# Patient Record
Sex: FEMALE | Race: OTHER | NOT HISPANIC OR LATINO
[De-identification: names, ages, dates, MRNs, and addresses within clinical notes are randomized per-mention and may not be internally consistent; named-entity substitution may affect disease eponyms.]

---

## 2017-08-27 ENCOUNTER — RX RENEWAL (OUTPATIENT)
Age: 62
End: 2017-08-27

## 2018-01-24 ENCOUNTER — NON-APPOINTMENT (OUTPATIENT)
Age: 63
End: 2018-01-24

## 2018-01-24 ENCOUNTER — APPOINTMENT (OUTPATIENT)
Dept: CARDIOLOGY | Facility: CLINIC | Age: 63
End: 2018-01-24
Payer: COMMERCIAL

## 2018-01-24 VITALS
OXYGEN SATURATION: 99 % | HEART RATE: 76 BPM | HEIGHT: 64 IN | SYSTOLIC BLOOD PRESSURE: 134 MMHG | DIASTOLIC BLOOD PRESSURE: 87 MMHG | BODY MASS INDEX: 29.37 KG/M2 | WEIGHT: 172 LBS

## 2018-01-24 PROCEDURE — 99214 OFFICE O/P EST MOD 30 MIN: CPT

## 2018-01-24 PROCEDURE — 93000 ELECTROCARDIOGRAM COMPLETE: CPT

## 2018-08-09 ENCOUNTER — MEDICATION RENEWAL (OUTPATIENT)
Age: 63
End: 2018-08-09

## 2019-01-30 ENCOUNTER — RX RENEWAL (OUTPATIENT)
Age: 64
End: 2019-01-30

## 2019-02-11 ENCOUNTER — RX RENEWAL (OUTPATIENT)
Age: 64
End: 2019-02-11

## 2019-08-08 ENCOUNTER — RX RENEWAL (OUTPATIENT)
Age: 64
End: 2019-08-08

## 2019-09-04 ENCOUNTER — NON-APPOINTMENT (OUTPATIENT)
Age: 64
End: 2019-09-04

## 2019-09-04 ENCOUNTER — APPOINTMENT (OUTPATIENT)
Dept: CARDIOLOGY | Facility: CLINIC | Age: 64
End: 2019-09-04
Payer: COMMERCIAL

## 2019-09-04 VITALS
OXYGEN SATURATION: 100 % | HEIGHT: 64 IN | SYSTOLIC BLOOD PRESSURE: 122 MMHG | BODY MASS INDEX: 29.37 KG/M2 | DIASTOLIC BLOOD PRESSURE: 81 MMHG | WEIGHT: 172 LBS | HEART RATE: 76 BPM

## 2019-09-04 PROCEDURE — 99213 OFFICE O/P EST LOW 20 MIN: CPT

## 2019-09-04 PROCEDURE — 93000 ELECTROCARDIOGRAM COMPLETE: CPT

## 2019-09-04 NOTE — PHYSICAL EXAM
[General Appearance - Well Developed] : well developed [Normal Appearance] : normal appearance [Well Groomed] : well groomed [No Deformities] : no deformities [General Appearance - Well Nourished] : well nourished [General Appearance - In No Acute Distress] : no acute distress [Normal Conjunctiva] : the conjunctiva exhibited no abnormalities [Eyelids - No Xanthelasma] : the eyelids demonstrated no xanthelasmas [Normal Oral Mucosa] : normal oral mucosa [No Oral Pallor] : no oral pallor [No Oral Cyanosis] : no oral cyanosis [Respiration, Rhythm And Depth] : normal respiratory rhythm and effort [Exaggerated Use Of Accessory Muscles For Inspiration] : no accessory muscle use [Auscultation Breath Sounds / Voice Sounds] : lungs were clear to auscultation bilaterally [Heart Rate And Rhythm] : heart rate and rhythm were normal [Heart Sounds] : normal S1 and S2 [Murmurs] : no murmurs present [Edema] : no peripheral edema present [Abdomen Soft] : soft [Abdomen Tenderness] : non-tender [Abnormal Walk] : normal gait [Gait - Sufficient For Exercise Testing] : the gait was sufficient for exercise testing [Cyanosis, Localized] : no localized cyanosis [Skin Color & Pigmentation] : normal skin color and pigmentation [] : no rash [No Venous Stasis] : no venous stasis [Oriented To Time, Place, And Person] : oriented to person, place, and time [Affect] : the affect was normal [Mood] : the mood was normal [No Anxiety] : not feeling anxious [FreeTextEntry1] : no carotid bruits or JVD

## 2019-09-04 NOTE — DISCUSSION/SUMMARY
[Cardiomyopathy] : cardiomyopathy [Hypertension] : hypertension [Stable] : stable [FreeTextEntry1] : \par Currently stable from a cardiovascular standpoint. Normotensive. History of cardiomyopathy (chemo-induced). Currently euvolemic. Atypical chest pains likely musculoskeletal in origin. Continue current medications. ECG completed today and reviewed (findings as noted above). Will schedule an echocardiogram to reassess her cardiac structures and function. Follow up in 6 months.

## 2019-09-04 NOTE — HISTORY OF PRESENT ILLNESS
[FreeTextEntry1] : Doing okay. Denies shortness of breath or palpitations. Occasional left-sided chest pain unrelated to exertion.

## 2020-02-10 ENCOUNTER — OUTPATIENT (OUTPATIENT)
Dept: OUTPATIENT SERVICES | Facility: HOSPITAL | Age: 65
LOS: 1 days | Discharge: HOME | End: 2020-02-10

## 2020-02-10 ENCOUNTER — OUTPATIENT (OUTPATIENT)
Dept: OUTPATIENT SERVICES | Facility: HOSPITAL | Age: 65
LOS: 1 days | End: 2020-02-10

## 2020-02-11 ENCOUNTER — APPOINTMENT (OUTPATIENT)
Dept: HEMATOLOGY ONCOLOGY | Facility: CLINIC | Age: 65
End: 2020-02-11
Payer: MEDICARE

## 2020-02-11 ENCOUNTER — RESULT REVIEW (OUTPATIENT)
Age: 65
End: 2020-02-11

## 2020-02-11 VITALS
SYSTOLIC BLOOD PRESSURE: 111 MMHG | TEMPERATURE: 98.9 F | OXYGEN SATURATION: 100 % | BODY MASS INDEX: 28.31 KG/M2 | WEIGHT: 164.88 LBS | HEART RATE: 72 BPM | RESPIRATION RATE: 15 BRPM | DIASTOLIC BLOOD PRESSURE: 75 MMHG

## 2020-02-11 DIAGNOSIS — Z82.61 FAMILY HISTORY OF ARTHRITIS: ICD-10-CM

## 2020-02-11 DIAGNOSIS — Z85.41 PERSONAL HISTORY OF MALIGNANT NEOPLASM OF CERVIX UTERI: ICD-10-CM

## 2020-02-11 DIAGNOSIS — C85.90 NON-HODGKIN LYMPHOMA, UNSPECIFIED, UNSPECIFIED SITE: ICD-10-CM

## 2020-02-11 DIAGNOSIS — M19.90 UNSPECIFIED OSTEOARTHRITIS, UNSPECIFIED SITE: ICD-10-CM

## 2020-02-11 LAB
BASOPHILS # BLD AUTO: 0.1 K/UL — SIGNIFICANT CHANGE UP (ref 0–0.2)
BASOPHILS NFR BLD AUTO: 0.6 % — SIGNIFICANT CHANGE UP (ref 0–2)
EOSINOPHIL # BLD AUTO: 0.3 K/UL — SIGNIFICANT CHANGE UP (ref 0–0.5)
EOSINOPHIL NFR BLD AUTO: 2.8 % — SIGNIFICANT CHANGE UP (ref 0–6)
HCT VFR BLD CALC: 35.6 % — SIGNIFICANT CHANGE UP (ref 34.5–45)
HGB BLD-MCNC: 11.6 G/DL — SIGNIFICANT CHANGE UP (ref 11.5–15.5)
LYMPHOCYTES # BLD AUTO: 3.6 K/UL — HIGH (ref 1–3.3)
LYMPHOCYTES # BLD AUTO: 38.8 % — SIGNIFICANT CHANGE UP (ref 13–44)
MCHC RBC-ENTMCNC: 29.6 PG — SIGNIFICANT CHANGE UP (ref 27–34)
MCHC RBC-ENTMCNC: 32.7 G/DL — SIGNIFICANT CHANGE UP (ref 32–36)
MCV RBC AUTO: 90.6 FL — SIGNIFICANT CHANGE UP (ref 80–100)
MONOCYTES # BLD AUTO: 0.6 K/UL — SIGNIFICANT CHANGE UP (ref 0–0.9)
MONOCYTES NFR BLD AUTO: 6 % — SIGNIFICANT CHANGE UP (ref 2–14)
NEUTROPHILS # BLD AUTO: 4.8 K/UL — SIGNIFICANT CHANGE UP (ref 1.8–7.4)
NEUTROPHILS NFR BLD AUTO: 51.9 % — SIGNIFICANT CHANGE UP (ref 43–77)
PLATELET # BLD AUTO: 247 K/UL — SIGNIFICANT CHANGE UP (ref 150–400)
RBC # BLD: 3.93 M/UL — SIGNIFICANT CHANGE UP (ref 3.8–5.2)
RBC # FLD: 13.2 % — SIGNIFICANT CHANGE UP (ref 10.3–14.5)
WBC # BLD: 9.2 K/UL — SIGNIFICANT CHANGE UP (ref 3.8–10.5)
WBC # FLD AUTO: 9.2 K/UL — SIGNIFICANT CHANGE UP (ref 3.8–10.5)

## 2020-02-11 PROCEDURE — 99214 OFFICE O/P EST MOD 30 MIN: CPT

## 2020-02-11 NOTE — ADDENDUM
[FreeTextEntry1] : Documented by Kassandra Vazquez acting as a scribe for Dr. Moni Sanchez on 02/11/2020.\par \par All medical record entries made by the Scribe were at my, Dr. Moni Sanchez, direction and personally dictated by me on 02/11/2020. I have reviewed the chart and agree that  the record accurately reflects my personal performance of the history, physical exam, assessment and plan. I have also personally directed, reviewed, and agree with the discharge instructions.

## 2020-02-11 NOTE — ASSESSMENT
[FreeTextEntry1] : Ms. 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 Her preparative regimen included Cytoxan, VP16, BCNU. STEPHY..\par \par Peripheral blood work was reviewed and and discussed with patient. \par Advised patient to continue follow-up with PMD and GYN as well as undergo regular screenings, such as colonoscopies, mammograms, PAP smears, etc.\par Advised patient to follow up with ophthalmologist for vision issues and floaters.\par Well care and cancer screening  stressed, questions addressed, support provided. \par Patient advised to contact immediately with any concerns or symptoms.\par RTC for f/u with Dr. Sanchez in two years.

## 2020-02-11 NOTE — REVIEW OF SYSTEMS
[Negative] : Heme/Lymph [Vision Problems] : vision problems [FreeTextEntry7] : intermittent right-sided abdominal discomfort [FreeTextEntry3] : floaters

## 2020-02-11 NOTE — HISTORY OF PRESENT ILLNESS
[de-identified] : Ms. 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 Her preparative regimen included Cytoxan, VP16, BCNU. Repeat CT scan after the transplant showed that she was in remission. Plan was to give her a consolidative course of Rituxan chemotherapy, however, shortly after the first treatment of Rituxan which was given the day after a transfusion of red blood cells, Mary developed acute onset of SOB, LE edema, severe anemia, and thrombocytopenia. Her creatinine and LFTs also maxwell significantly, potentially because of a transfusion reaction and/or reaction to the Rituxan. [de-identified] : Patient presents for a two year follow-up visit today s/p Auto-PSCT in December 2001. She reports feeling overall well. She notes intermittent right-sided abdominal discomfort which she states has occurred for about eighteen years. She notes she has not experienced the discomfort for about two weeks now. She states she is up to date with her mammogram and had it mid last year. She notes she is due for colonoscopy. She reports she is taking hydralazine and simethicone. She notes she received a flu vaccine in November 2019. She states she fell ill with Dengue fever when she was in [facility] in December 2019. As a result, she now has vision issues consisting of floaters. She states she will be following up with an ophthalmologist. She states her weight is stable and she has been attempting to maintain a healthier diet. Denies fever/chills, night sweats, mouth sores, eye dryness, blurred vision, nausea, vomiting, diarrhea. No CP, SOB or LE edema.

## 2020-02-12 ENCOUNTER — RX RENEWAL (OUTPATIENT)
Age: 65
End: 2020-02-12

## 2020-02-27 DIAGNOSIS — C85.90 NON-HODGKIN LYMPHOMA, UNSPECIFIED, UNSPECIFIED SITE: ICD-10-CM

## 2021-02-11 ENCOUNTER — NON-APPOINTMENT (OUTPATIENT)
Age: 66
End: 2021-02-11

## 2021-02-11 ENCOUNTER — APPOINTMENT (OUTPATIENT)
Dept: CARDIOLOGY | Facility: CLINIC | Age: 66
End: 2021-02-11
Payer: MEDICARE

## 2021-02-11 VITALS — WEIGHT: 177 LBS | BODY MASS INDEX: 30.38 KG/M2

## 2021-02-11 VITALS
TEMPERATURE: 97.3 F | HEIGHT: 64 IN | SYSTOLIC BLOOD PRESSURE: 137 MMHG | OXYGEN SATURATION: 98 % | BODY MASS INDEX: 30.38 KG/M2 | DIASTOLIC BLOOD PRESSURE: 83 MMHG | HEART RATE: 83 BPM

## 2021-02-11 PROCEDURE — 99072 ADDL SUPL MATRL&STAF TM PHE: CPT

## 2021-02-11 PROCEDURE — 99213 OFFICE O/P EST LOW 20 MIN: CPT

## 2021-02-11 PROCEDURE — 93000 ELECTROCARDIOGRAM COMPLETE: CPT

## 2021-02-11 NOTE — PHYSICAL EXAM
[General Appearance - Well Developed] : well developed [Normal Appearance] : normal appearance [Well Groomed] : well groomed [General Appearance - Well Nourished] : well nourished [No Deformities] : no deformities [General Appearance - In No Acute Distress] : no acute distress [Normal Conjunctiva] : the conjunctiva exhibited no abnormalities [Eyelids - No Xanthelasma] : the eyelids demonstrated no xanthelasmas [Normal Oral Mucosa] : normal oral mucosa [No Oral Pallor] : no oral pallor [No Oral Cyanosis] : no oral cyanosis [Respiration, Rhythm And Depth] : normal respiratory rhythm and effort [Exaggerated Use Of Accessory Muscles For Inspiration] : no accessory muscle use [Auscultation Breath Sounds / Voice Sounds] : lungs were clear to auscultation bilaterally [Heart Rate And Rhythm] : heart rate and rhythm were normal [Heart Sounds] : normal S1 and S2 [Murmurs] : no murmurs present [Edema] : no peripheral edema present [Abdomen Soft] : soft [Abdomen Tenderness] : non-tender [Abnormal Walk] : normal gait [Gait - Sufficient For Exercise Testing] : the gait was sufficient for exercise testing [Cyanosis, Localized] : no localized cyanosis [Skin Color & Pigmentation] : normal skin color and pigmentation [] : no rash [No Venous Stasis] : no venous stasis [Oriented To Time, Place, And Person] : oriented to person, place, and time [Affect] : the affect was normal [No Anxiety] : not feeling anxious [Mood] : the mood was normal [FreeTextEntry1] : no carotid bruits or JVD

## 2021-02-11 NOTE — DISCUSSION/SUMMARY
[Cardiomyopathy] : cardiomyopathy [Hypertension] : hypertension [Stable] : stable [FreeTextEntry1] : \par Currently stable from a cardiovascular standpoint. Normotensive. History of nonischemic cardiomyopathy with interval improvement in LV systolic function. Currently euvolemic. Asymptomatic. Patient with history of follicular lymphoma which was treated with chemotherapy. Continue current medications. ECG completed today and reviewed (findings as noted above). Will schedule an echocardiogram to reassess her cardiac structures and function. Follow up annually.

## 2021-03-02 ENCOUNTER — OUTPATIENT (OUTPATIENT)
Dept: OUTPATIENT SERVICES | Facility: HOSPITAL | Age: 66
LOS: 1 days | End: 2021-03-02

## 2021-03-02 ENCOUNTER — APPOINTMENT (OUTPATIENT)
Dept: CV DIAGNOSITCS | Facility: HOSPITAL | Age: 66
End: 2021-03-02
Payer: MEDICARE

## 2021-03-02 DIAGNOSIS — I42.9 CARDIOMYOPATHY, UNSPECIFIED: ICD-10-CM

## 2021-03-02 PROCEDURE — 93306 TTE W/DOPPLER COMPLETE: CPT | Mod: 26

## 2022-01-18 ENCOUNTER — RX RENEWAL (OUTPATIENT)
Age: 67
End: 2022-01-18

## 2022-08-24 ENCOUNTER — APPOINTMENT (OUTPATIENT)
Dept: CARDIOLOGY | Facility: CLINIC | Age: 67
End: 2022-08-24

## 2022-08-24 ENCOUNTER — NON-APPOINTMENT (OUTPATIENT)
Age: 67
End: 2022-08-24

## 2022-08-24 VITALS
SYSTOLIC BLOOD PRESSURE: 146 MMHG | BODY MASS INDEX: 29.02 KG/M2 | OXYGEN SATURATION: 100 % | HEART RATE: 63 BPM | HEIGHT: 64 IN | DIASTOLIC BLOOD PRESSURE: 89 MMHG | WEIGHT: 170 LBS

## 2022-08-24 PROCEDURE — 99214 OFFICE O/P EST MOD 30 MIN: CPT | Mod: 25

## 2022-08-24 PROCEDURE — 93000 ELECTROCARDIOGRAM COMPLETE: CPT

## 2022-08-25 NOTE — DISCUSSION/SUMMARY
[Cardiomyopathy] : cardiomyopathy [Hypertension] : hypertension [Stable] : stable [EKG obtained to assist in diagnosis and management of assessed problem(s)] : EKG obtained to assist in diagnosis and management of assessed problem(s) [FreeTextEntry1] : \par Currently stable from a cardiovascular standpoint. Borderline BP today (repeat 135/82 mmHg). History of nonischemic cardiomyopathy with interval improvement in LV systolic function (LVEF 62%). Currently euvolemic. Asymptomatic. Patient with history of follicular lymphoma which was treated with chemotherapy. Continue current medications including metoprolol and hydralazine. ECG completed today and reviewed (findings as noted above). Will schedule an echocardiogram to reassess her cardiac structures and function. Follow up annually.

## 2022-08-25 NOTE — HISTORY OF PRESENT ILLNESS
[FreeTextEntry1] : Doing okay. Denies chest pain, shortness of breath or palpitations. Mother passed after having a stroke in May 2022. Patient had the flu and bronchitis in June while she was in [place].

## 2022-08-25 NOTE — CARDIOLOGY SUMMARY
[de-identified] : \par 08/24/22 - normal sinus rhythm, LVH\par  [de-identified] : \par 03/02/21 - MAC, calcified AV with normal opening, normal LA, grossly normal LV systolic function, mild diastolic dysfunction, grossly normal RV systolic function, mild MD, LVEF 62%\par 02/17/15 - mild MR, normal LA, grossly normal LV systolic function, normal RV size and function, PASP 40 mmHg, LVEF 62%\par 10/22/12 - MAC, mild MR, mild global LV systolic dysfunction, mild diastolic dysfunction, RVSP 33 mmHg, LVEF 50%

## 2022-09-09 ENCOUNTER — RX RENEWAL (OUTPATIENT)
Age: 67
End: 2022-09-09

## 2022-09-14 ENCOUNTER — OUTPATIENT (OUTPATIENT)
Dept: OUTPATIENT SERVICES | Facility: HOSPITAL | Age: 67
LOS: 1 days | End: 2022-09-14

## 2022-09-14 ENCOUNTER — APPOINTMENT (OUTPATIENT)
Dept: CV DIAGNOSITCS | Facility: HOSPITAL | Age: 67
End: 2022-09-14

## 2022-09-14 DIAGNOSIS — I42.9 CARDIOMYOPATHY, UNSPECIFIED: ICD-10-CM

## 2022-09-14 DIAGNOSIS — I10 ESSENTIAL (PRIMARY) HYPERTENSION: ICD-10-CM

## 2022-09-14 PROCEDURE — 93306 TTE W/DOPPLER COMPLETE: CPT | Mod: 26

## 2023-05-26 ENCOUNTER — NON-APPOINTMENT (OUTPATIENT)
Age: 68
End: 2023-05-26

## 2023-08-30 ENCOUNTER — RX RENEWAL (OUTPATIENT)
Age: 68
End: 2023-08-30

## 2023-08-30 RX ORDER — METOPROLOL TARTRATE 25 MG/1
25 TABLET, FILM COATED ORAL
Qty: 180 | Refills: 3 | Status: ACTIVE | COMMUNITY
Start: 2018-08-09 | End: 1900-01-01

## 2023-09-20 ENCOUNTER — APPOINTMENT (OUTPATIENT)
Dept: CARDIOLOGY | Facility: CLINIC | Age: 68
End: 2023-09-20
Payer: MEDICARE

## 2023-09-20 ENCOUNTER — NON-APPOINTMENT (OUTPATIENT)
Age: 68
End: 2023-09-20

## 2023-09-20 VITALS
BODY MASS INDEX: 29.53 KG/M2 | DIASTOLIC BLOOD PRESSURE: 78 MMHG | SYSTOLIC BLOOD PRESSURE: 128 MMHG | HEIGHT: 64 IN | HEART RATE: 74 BPM | OXYGEN SATURATION: 99 % | WEIGHT: 173 LBS

## 2023-09-20 DIAGNOSIS — I42.9 CARDIOMYOPATHY, UNSPECIFIED: ICD-10-CM

## 2023-09-20 DIAGNOSIS — I10 ESSENTIAL (PRIMARY) HYPERTENSION: ICD-10-CM

## 2023-09-20 PROCEDURE — 93000 ELECTROCARDIOGRAM COMPLETE: CPT

## 2023-09-20 PROCEDURE — 99213 OFFICE O/P EST LOW 20 MIN: CPT | Mod: 25

## 2024-05-10 ENCOUNTER — EMERGENCY (EMERGENCY)
Facility: HOSPITAL | Age: 69
LOS: 1 days | Discharge: HOME | End: 2024-05-10
Attending: EMERGENCY MEDICINE
Payer: MEDICARE

## 2024-05-10 VITALS
HEIGHT: 64 IN | WEIGHT: 174.17 LBS | RESPIRATION RATE: 17 BRPM | SYSTOLIC BLOOD PRESSURE: 161 MMHG | DIASTOLIC BLOOD PRESSURE: 85 MMHG | HEART RATE: 87 BPM | TEMPERATURE: 98 F | OXYGEN SATURATION: 100 %

## 2024-05-10 PROCEDURE — 99283 EMERGENCY DEPT VISIT LOW MDM: CPT

## 2024-05-10 RX ORDER — FLUORESCEIN SODIUM 9 MG
1 STRIP OPHTHALMIC (EYE) ONCE
Refills: 0 | Status: COMPLETED | OUTPATIENT
Start: 2024-05-10 | End: 2024-05-10

## 2024-05-10 RX ADMIN — Medication 1 APPLICATION(S): at 21:00

## 2024-05-10 RX ADMIN — Medication 1 DROP(S): at 21:00

## 2024-05-10 NOTE — ED PROVIDER NOTE - OBJECTIVE STATEMENT
69-year-old female with a past medical history of hypertension presents with left eye redness and mild discomfort.  Patient states started spontaneously.  Denies use of any blood thinners or aspirin.  Denies trauma.  Patient states she initially went to urgent care and then was referred to the ED for evaluation.  States her vision is entirely normal and has not changed since the redness started.

## 2024-05-10 NOTE — ED PROVIDER NOTE - PATIENT PORTAL LINK FT
You can access the FollowMyHealth Patient Portal offered by [facility] by registering at the following website: [email or web address]. By joining [employer]’s FollowMyHealth portal, you will also be able to view your health information using other applications (apps) compatible with our system.

## 2024-05-10 NOTE — ED PROVIDER NOTE - CLINICAL SUMMARY MEDICAL DECISION MAKING FREE TEXT BOX
69-year-old female with redness to left eye.  PE as above.    Exam consistent with a subconjunctival hemorrhage.  Normal pressures.  Vision is unchanged.  Will discharge.  Patient already has an ophthalmologist and she is going to follow-up with them this week.  Return precautions discussed

## 2024-05-10 NOTE — ED PROVIDER NOTE - PHYSICAL EXAMINATION
Bilateral eyes with vision unchanged with glasses.  Pupils equally round and reactive to light.  Extraocular muscles intact bilaterally.  Left eye with a subconjunctival hemorrhage on the entirety of the conjunctiva.  No fluorescein uptake.  Left intraocular pressure x 2 is 11

## 2024-05-14 ENCOUNTER — APPOINTMENT (OUTPATIENT)
Dept: OPHTHALMOLOGY | Facility: CLINIC | Age: 69
End: 2024-05-14
Payer: MEDICARE

## 2024-05-14 ENCOUNTER — NON-APPOINTMENT (OUTPATIENT)
Age: 69
End: 2024-05-14

## 2024-05-14 PROCEDURE — 92004 COMPRE OPH EXAM NEW PT 1/>: CPT | Mod: 25

## 2024-05-14 PROCEDURE — 92133 CPTRZD OPH DX IMG PST SGM ON: CPT

## 2024-08-24 NOTE — PHYSICAL EXAM
Problem: Safety - Adult  Goal: Free from fall injury  8/24/2024 0043 by Adrianna Han RN  Outcome: Progressing  8/23/2024 2303 by Roman Salcido LPN  Outcome: Progressing     Problem: ABCDS Injury Assessment  Goal: Absence of physical injury  8/24/2024 0043 by Adrianna Han RN  Outcome: Progressing  8/23/2024 2303 by Roman Salcido LPN  Outcome: Progressing     Problem: Skin/Tissue Integrity  Goal: Absence of new skin breakdown  Description: 1.  Monitor for areas of redness and/or skin breakdown  2.  Assess vascular access sites hourly  3.  Every 4-6 hours minimum:  Change oxygen saturation probe site  4.  Every 4-6 hours:  If on nasal continuous positive airway pressure, respiratory therapy assess nares and determine need for appliance change or resting period.  8/24/2024 0043 by Adrianna Han RN  Outcome: Progressing  8/23/2024 2303 by Roman Salcido LPN  Outcome: Progressing     Problem: Pain  Goal: Verbalizes/displays adequate comfort level or baseline comfort level  8/24/2024 0043 by Adrianna Han RN  Outcome: Progressing  8/23/2024 2303 by Roman Salcido LPN  Outcome: Progressing      [Well Developed] : well developed [Well Nourished] : well nourished [No Acute Distress] : no acute distress [Normal Conjunctiva] : normal conjunctiva [Normal Venous Pressure] : normal venous pressure [No Carotid Bruit] : no carotid bruit [Normal S1, S2] : normal S1, S2 [No Murmur] : no murmur [No Rub] : no rub [No Gallop] : no gallop [Clear Lung Fields] : clear lung fields [Good Air Entry] : good air entry [No Respiratory Distress] : no respiratory distress  [Soft] : abdomen soft [Non Tender] : non-tender [Normal Gait] : normal gait [No Edema] : no edema [No Cyanosis] : no cyanosis [No Rash] : no rash [No Skin Lesions] : no skin lesions [Moves all extremities] : moves all extremities [No Focal Deficits] : no focal deficits [Normal Speech] : normal speech [Alert and Oriented] : alert and oriented

## 2024-08-26 ENCOUNTER — RX RENEWAL (OUTPATIENT)
Age: 69
End: 2024-08-26

## 2024-08-30 ENCOUNTER — RX RENEWAL (OUTPATIENT)
Age: 69
End: 2024-08-30